# Patient Record
Sex: FEMALE | Race: WHITE
[De-identification: names, ages, dates, MRNs, and addresses within clinical notes are randomized per-mention and may not be internally consistent; named-entity substitution may affect disease eponyms.]

---

## 2020-12-09 ENCOUNTER — HOSPITAL ENCOUNTER (OUTPATIENT)
Dept: HOSPITAL 63 - US | Age: 65
End: 2020-12-09
Attending: INTERNAL MEDICINE
Payer: MEDICARE

## 2020-12-09 DIAGNOSIS — R16.2: Primary | ICD-10-CM

## 2020-12-09 DIAGNOSIS — B19.10: ICD-10-CM

## 2020-12-09 DIAGNOSIS — K74.69: ICD-10-CM

## 2020-12-09 LAB
AFPT MARKER: 2.8 NG/ML (ref 0–8.3)
ALBUMIN SERPL-MCNC: 4.1 G/DL (ref 3.4–5)
ALP SERPL-CCNC: 58 U/L (ref 46–116)
ALT SERPL-CCNC: 43 U/L (ref 14–59)
AST SERPL-CCNC: 32 U/L (ref 15–37)
BILIRUB DIRECT SERPL-MCNC: 0.3 MG/DL (ref 0–0.2)
BILIRUB SERPL-MCNC: 1.3 MG/DL (ref 0.2–1)
PROT SERPL-MCNC: 7.3 G/DL (ref 6.4–8.2)

## 2020-12-09 PROCEDURE — 82105 ALPHA-FETOPROTEIN SERUM: CPT

## 2020-12-09 PROCEDURE — 36415 COLL VENOUS BLD VENIPUNCTURE: CPT

## 2020-12-09 PROCEDURE — 76700 US EXAM ABDOM COMPLETE: CPT

## 2020-12-09 PROCEDURE — 87517 HEPATITIS B DNA QUANT: CPT

## 2020-12-09 PROCEDURE — 80076 HEPATIC FUNCTION PANEL: CPT

## 2020-12-09 NOTE — RAD
ABDOMEN COMPLETE: 12/9/2020 8:00 AM

 

Indication: 65 years old Female. Hepatitis B cirrhosis.

 

Comparison: None.

 

TECHNIQUE: Sonographic evaluation of the abdomen is performed utilizing 

grayscale and color Doppler.

 

FINDINGS:

 

Liver: There is diffuse increased echogenicity of the hepatic parenchyma 

compatible with diffuse hepatocellular disease, most commonly due to 

steatosis. This decreases the sensitivity of ultrasound for the detection 

of focal hepatic lesions. Coarse echotexture of the hepatic parenchyma may

reflect micronodular appearance associated with cirrhosis. There is 

hepatopedal flow within the portal venous system. Right hepatic lobe 

measures 13.3 cm.

 

Biliary system: CBD measures 1.5 mm. There is no intrahepatic or 

extrahepatic biliary dilatation.

 

Gallbladder: No gallstones identified. Mild gallbladder wall thickening, 

nonspecific and may be associated with adjacent hepatic inflammation. 

Sonographic Pro sign: Negative

 

Pancreas: Visualized head and uncinate process are unremarkable. Body and 

tail are not visualized.

 

Spleen: 14.2 cm.

 

Right kidney: 8.7 x 3.6 x 4.3 cm. No hydronephrosis. Normal echotexture 

without focal mass or renal calculus. 

 

Left kidney: 10.4 x 5.0 x 2.8 cm. No hydronephrosis. Normal echotexture 

without focal mass or renal calculus.

 

Abdominal aorta and IVC: Visualized portions unremarkable.

 

Free fluid:None.

 

 

IMPRESSION:

1. Diffuse increased echogenicity of the hepatic parenchyma suggestive of 

diffuse hepatocellular disease, most commonly due to hepatic steatosis. 

Morphology of the hepatic parenchyma may reflect underlying cirrhosis. 

Limited evaluation for hepatic masses. Further evaluation with MRI abdomen

with and without contrast could be of benefit.

2. Mild splenomegaly measuring 14.2 cm. Findings may reflect portal 

hypertension.

3. Nonspecific gallbladder wall thickening may be associated with hepatic 

inflammation.

 

Electronically signed by: Danisha Orellana MD (12/9/2020 8:40 AM) 

[facility]

## 2020-12-21 ENCOUNTER — HOSPITAL ENCOUNTER (OUTPATIENT)
Dept: HOSPITAL 61 - KCIC DEXA | Age: 65
End: 2020-12-21
Payer: MEDICARE

## 2020-12-21 DIAGNOSIS — M43.8X2: ICD-10-CM

## 2020-12-21 DIAGNOSIS — Z78.0: ICD-10-CM

## 2020-12-21 DIAGNOSIS — M47.812: ICD-10-CM

## 2020-12-21 DIAGNOSIS — M85.88: ICD-10-CM

## 2020-12-21 DIAGNOSIS — M81.0: Primary | ICD-10-CM

## 2020-12-21 PROCEDURE — 77080 DXA BONE DENSITY AXIAL: CPT

## 2020-12-21 PROCEDURE — 72141 MRI NECK SPINE W/O DYE: CPT

## 2020-12-21 PROCEDURE — 77081 DXA BONE DENSITY APPENDICULR: CPT

## 2020-12-21 NOTE — [facility]
EXAM: Dual energy x-ray absorptiometry (DEXA).



HISTORY: Menopausal.



COMPARISON: None available.



TECHNIQUE: Dual energy x-ray absorptiometry of the lumbar spine and left hip was performed.  Calculat
ion of bone mineral density based on standard deviations above or below the expected young adult norm
al value (T-score) was completed. 



FINDINGS: The average bone mineral density in the third through 4th lumbar vertebrae is 1.262 g/cmxcm
, corresponding with a T-score of 1.5.



The average total bone mineral density in the left hip is 0.84 g/cmxcm, corresponding with a  T-score
 of -1.0.





IMPRESSION:   

                 

Findings are within the range of normal bone density with relation of the spine and borderline osteop
enia with relation to the left hip.



Note: Definitions established by the World Health Organization:



1. Normal: T-score is -1.0 or above.

2. Osteopenia: T-score is between -1.0 and -2.5 .

3. Osteoporosis: T-score is -2.5 or below. 



Electronically signed by: Ambar Fisher MD (12/21/2020 1:44 PM) [id number]

## 2020-12-21 NOTE — [facility]
EXAM: Dual energy x-ray absorptiometry (DEXA).



HISTORY: Menopausal.



COMPARISON: None available.



TECHNIQUE: Dual energy x-ray absorptiometry of the left forearm was performed.  Calculation of bone m
ineral density based on standard deviations above or below the expected young adult normal value (T-s
core) was completed. 



FINDINGS:



The average total bone mineral density in the left forearm is 0.511 g/cmxcm, corresponding with a  T-
score of -1.0.





IMPRESSION:   

                 

Findings are within the range of borderline osteopenia with relation of the left forearm.



Note: Definitions established by the World Health Organization:



1. Normal: T-score is -1.0 or above.

2. Osteopenia: T-score is between -1.0 and -2.5 .

3. Osteoporosis: T-score is -2.5 or below. 



Electronically signed by: Ambar Fisher MD (12/21/2020 1:45 PM) [id number]

## 2020-12-21 NOTE — [facility]
MRI of the cervical spine without contrast 12/21/2020



CLINICAL HISTORY: Left-sided neck pain.



TECHNIQUE: Unenhanced T1-weighted, T2-weighted and inversion recovery sagittal and gradient echo and 
T2-weighted axial images of the cervical spine were obtained.



FINDINGS: Very mild lateral curvature of the cervical spine is seen convex to the right. There is str
aightening of the normal cervical lordosis. Degenerative signal changes are seen involving all of the
 disks of the cervical spine. Degenerative signal changes are seen within the marrow surrounding thes
e discs. Loss of height of the C4-5, C5-6 and C6-7 discs is noted. No area of abnormal signal intensi
ty is seen involving the cervical spinal cord.



At the C2-3 disc space there is a mild generalized disc bulges. Degenerative changes are seen involvi
ng the uncovertebral and facet joints bilaterally. These findings do not result in significant centra
l spinal canal or neural foraminal stenosis.



At the C3-4 disc space there is a mild generalized disc bulge. Degenerative changes are seen involvin
g the uncovertebral and facet joints, left greater than right. These findings do not result in signif
icant central spinal canal or neural foraminal stenosis.



At the C4-5 disc space there is a mild generalized disc bulge. Degenerative changes are seen involvin
g the uncovertebral and facet joints bilaterally. These findings do not result in significant central
 spinal canal or neural foraminal stenosis.



At the C5-6 disc space there is a mild generalized disc bulge. Superimposed on this disc bulge is a c
entral/left paracentral focal disc protrusion. This measures 2 mm in AP diameter. Degenerative change
s are seen involving the uncovertebral and facet joints bilaterally. These findings efface the anteri
or CSF without resulting in significant central spinal canal or neural foraminal stenosis.



At the C6-7 disc space there is a mild generalized disc bulge. Superimposed on this disc bulge is a f
ocal central disc protrusion. This measures 3 mm in AP diameter. Degenerative changes are seen involv
ing the uncovertebral and facet joints, left greater than right. These findings efface the anterior C
SF resulting in mild central spinal canal stenosis without evidence of cord impingement. Mild left ne
ural foraminal stenosis is seen. The right neural foramen is patent.



At the C7-T1 disc space there is a minimal generalized disc bulge. Degenerative changes are seen invo
lving the facet joints, left greater than right. These findings do not result in significant central 
spinal canal or neural foraminal stenosis.



IMPRESSION: Degenerative changes are seen throughout the cervical spine. These findings result in mil
d central spinal canal stenosis at C6-7 without evidence of cord impingement. Mild left neural forami
nal stenosis is seen at C6-7.



Electronically signed by: Rojas Juarez MD (12/21/2020 11:22 AM) [id number]

## 2021-02-22 ENCOUNTER — HOSPITAL ENCOUNTER (OUTPATIENT)
Dept: HOSPITAL 63 - MAMMO | Age: 66
End: 2021-02-22
Attending: FAMILY MEDICINE
Payer: MEDICARE

## 2021-02-22 DIAGNOSIS — N64.89: ICD-10-CM

## 2021-02-22 DIAGNOSIS — Z12.31: Primary | ICD-10-CM

## 2021-02-22 PROCEDURE — 77063 BREAST TOMOSYNTHESIS BI: CPT

## 2021-02-22 PROCEDURE — 77067 SCR MAMMO BI INCL CAD: CPT

## 2021-02-25 NOTE — RAD
DATE: 2/22/2021 9:45 AM



EXAM: MAMMO CASSIE SCREENING BILATERAL



HISTORY:  Screening



COMPARISON: 2/11/2020



Bilateral CC and MLO views of the breasts were performed. Bilateral breast

tomosynthesis was performed in CC and MLO projections.



This study was interpreted with the benefit of Computerized Aided Detection

(CAD).





FINDINGS:



Breast Density: HETERO  The breast parenchyma Is heterogeneously dense, which

could reduce sensitivity of mammography. Breast parenchyma level C





No suspicious masses, microcalcifications or architectural distortion is

present to suggest malignancy in either breast.





The visualized axillae are unremarkable. 



IMPRESSION: No mammographic evidence of malignancy. 



BI-RADS CATEGORY: 1 NEGATIVE



RECOMMENDED FOLLOW-UP: 12M 12 MONTH FOLLOW-UP Annual screening mammography is

recommended, unless clinically indicated sooner based on symptoms or change in

physical exam.



PQRS compliance statement: Patient information was entered into a reminder

system with a target due date for the next mammogram.



Mammography is a sensitive method for finding small breast cancers, but it

does not detect them all and is not a substitute for careful clinical

examination.  A negative mammogram does not negate a clinically suspicious

finding and should not result in delay in biopsying a clinically suspicious

abnormality.



"Our facility is accredited by the [employer] Mammography

Program."

## 2022-01-26 ENCOUNTER — HOSPITAL ENCOUNTER (OUTPATIENT)
Dept: HOSPITAL 63 - US | Age: 67
End: 2022-01-26
Attending: INTERNAL MEDICINE
Payer: MEDICARE

## 2022-01-26 DIAGNOSIS — B18.1: ICD-10-CM

## 2022-01-26 DIAGNOSIS — K74.60: Primary | ICD-10-CM

## 2022-01-26 DIAGNOSIS — I10: ICD-10-CM

## 2022-01-26 LAB
% LYMPHS: 37 % (ref 24–48)
% MONOS: 8 % (ref 0–10)
% SEGS: 53 % (ref 35–66)
AFPT MARKER: 3.2 NG/ML (ref 0–8.3)
ALBUMIN SERPL-MCNC: 3.7 G/DL (ref 3.4–5)
ALBUMIN/GLOB SERPL: 1.2 {RATIO} (ref 1–1.7)
ALP SERPL-CCNC: 50 U/L (ref 46–116)
ALT SERPL-CCNC: 26 U/L (ref 14–59)
ANION GAP SERPL CALC-SCNC: 8 MMOL/L (ref 6–14)
AST SERPL-CCNC: 17 U/L (ref 15–37)
BASOPHILS # BLD AUTO: 0 X10^3/UL (ref 0–0.2)
BASOPHILS NFR BLD: 1 % (ref 0–3)
BILIRUB SERPL-MCNC: 0.9 MG/DL (ref 0.2–1)
BUN/CREAT SERPL: 17 (ref 6–20)
CA-I SERPL ISE-MCNC: 12 MG/DL (ref 7–20)
CALCIUM SERPL-MCNC: 8.4 MG/DL (ref 8.5–10.1)
CHLORIDE SERPL-SCNC: 105 MMOL/L (ref 98–107)
CO2 SERPL-SCNC: 27 MMOL/L (ref 21–32)
CREAT SERPL-MCNC: 0.7 MG/DL (ref 0.6–1)
EOSINOPHIL NFR BLD AUTO: 2 % (ref 0–5)
EOSINOPHIL NFR BLD: 0.1 X10^3/UL (ref 0–0.7)
EOSINOPHIL NFR BLD: 4 % (ref 0–3)
ERYTHROCYTE [DISTWIDTH] IN BLOOD BY AUTOMATED COUNT: 13.9 % (ref 11.5–14.5)
GFR SERPLBLD BASED ON 1.73 SQ M-ARVRAT: 83.5 ML/MIN
GLOBULIN SER-MCNC: 3.1 G/DL (ref 2.2–3.8)
GLUCOSE SERPL-MCNC: 94 MG/DL (ref 70–99)
HCT VFR BLD CALC: 37.2 % (ref 36–47)
HGB BLD-MCNC: 12.4 G/DL (ref 12–15.5)
LYMPHOCYTES # BLD: 0.7 X10^3/UL (ref 1–4.8)
LYMPHOCYTES NFR BLD AUTO: 30 % (ref 24–48)
MCH RBC QN AUTO: 32 PG (ref 25–35)
MCHC RBC AUTO-ENTMCNC: 33 G/DL (ref 31–37)
MCV RBC AUTO: 95 FL (ref 79–100)
MONO #: 0.2 X10^3/UL (ref 0–1.1)
MONOCYTES NFR BLD: 9 % (ref 0–9)
NEUT #: 1.3 X10^3UL (ref 1.8–7.7)
NEUTROPHILS NFR BLD AUTO: 57 % (ref 31–73)
PLATELET # BLD AUTO: 48 X10^3/UL (ref 140–400)
PLATELET # BLD EST: (no result) 10*3/UL
POTASSIUM SERPL-SCNC: 3.5 MMOL/L (ref 3.5–5.1)
PROT SERPL-MCNC: 6.8 G/DL (ref 6.4–8.2)
RBC # BLD AUTO: 3.9 X10^6/UL (ref 3.5–5.4)
SODIUM SERPL-SCNC: 140 MMOL/L (ref 136–145)
WBC # BLD AUTO: 2.2 X10^3/UL (ref 4–11)

## 2022-01-26 PROCEDURE — 76705 ECHO EXAM OF ABDOMEN: CPT

## 2022-01-26 PROCEDURE — 85025 COMPLETE CBC W/AUTO DIFF WBC: CPT

## 2022-01-26 PROCEDURE — 85007 BL SMEAR W/DIFF WBC COUNT: CPT

## 2022-01-26 PROCEDURE — 82105 ALPHA-FETOPROTEIN SERUM: CPT

## 2022-01-26 PROCEDURE — 80053 COMPREHEN METABOLIC PANEL: CPT

## 2022-01-26 PROCEDURE — 87350 HEPATITIS BE AG IA: CPT

## 2022-03-03 ENCOUNTER — HOSPITAL ENCOUNTER (OUTPATIENT)
Dept: HOSPITAL 63 - MAMMO | Age: 67
End: 2022-03-03
Attending: FAMILY MEDICINE
Payer: MEDICARE

## 2022-03-03 DIAGNOSIS — Z12.31: Primary | ICD-10-CM

## 2022-03-03 PROCEDURE — 77063 BREAST TOMOSYNTHESIS BI: CPT

## 2022-03-03 PROCEDURE — 77067 SCR MAMMO BI INCL CAD: CPT

## 2023-03-09 NOTE — RAD
EXAM: Abdomen sonogram.



HISTORY: Cirrhosis. Hepatitis B.



TECHNIQUE: Sonographic imaging of the abdomen was performed.



COMPARISON: None.



FINDINGS: The liver is normal in size. No hepatic lesion is seen. There is coarse hepatic echotexture
. There are mildly thickened portions of the gallbladder wall. The common bile duct is normal in juanita
scottie. The right kidney and inferior vena cava are unremarkable. The pancreas is partially obscured due
 to bowel gas.



IMPRESSION:

1. Coarse hepatic echotexture. This can be seen with the reported history of cirrhosis. No focal hepa
tic lesion is seen sonographically. MRI is more sensitive for small lesions in the setting of cirrhos
is.

2. Mildly thickened portions of the gallbladder wall. This can be seen with intrinsic liver disease. 
The imaging appearance does not favor cholecystitis or adenomyomatosis.



Electronically signed by: Florencia Cota MD (1/26/2022 10:15 AM) [id number] Deconditioning; Hyperkalemia

## 2024-09-30 NOTE — RAD
INDICATION : Routine Screening.



COMPARISON: [facility] including January 2019



TECHNIQUE: Standard mammogram screening views of the bilateral breasts were obtained with 3D tomosynt
hesis. CAD was utilized.



FINDINGS:

The breasts are scattered density.  No definite suspicious mass.



IMPRESSION:



BI-RADS Category 2: Benign findings. Recommend repeat screening examination in one year.



The patient was placed into the recall system with a suggested recall date for follow up imaging.



Mammography is the most sensitive method for finding small breast cancers, but it does not detect the
m all and is not a substitute for careful clinical examination.  A negative mammogram does not negate
 a clinically suspicious finding and should not result in delay in biopsying a clinically suspicious 
abnormality.



Electronically signed by: Merrill Chong MD (3/3/2022 9:53 AM) [id number] Medication:   DULoxetine (CYMBALTA) 20 MG capsule  Last filled 07/19/2024 Josie Mott APNP 30 capsule 1 ordered          Patient Sig: Take 1 capsule by mouth daily.        Last office visit date: 7/30/24  Medication Refill Protocol Failed.  Protocol approved due to: other (documentation required). Pending labs